# Patient Record
Sex: FEMALE | Race: WHITE | Employment: OTHER | URBAN - NONMETROPOLITAN AREA
[De-identification: names, ages, dates, MRNs, and addresses within clinical notes are randomized per-mention and may not be internally consistent; named-entity substitution may affect disease eponyms.]

---

## 2019-05-29 ENCOUNTER — OFFICE VISIT (OUTPATIENT)
Dept: SURGERY | Age: 49
End: 2019-05-29
Payer: MEDICAID

## 2019-05-29 VITALS
HEIGHT: 62 IN | WEIGHT: 131 LBS | TEMPERATURE: 98.6 F | BODY MASS INDEX: 24.11 KG/M2 | SYSTOLIC BLOOD PRESSURE: 100 MMHG | DIASTOLIC BLOOD PRESSURE: 68 MMHG | OXYGEN SATURATION: 98 % | HEART RATE: 113 BPM

## 2019-05-29 DIAGNOSIS — R10.84 GENERALIZED ABDOMINAL PAIN: Primary | ICD-10-CM

## 2019-05-29 DIAGNOSIS — R14.0 BLOATING: ICD-10-CM

## 2019-05-29 PROCEDURE — G8427 DOCREV CUR MEDS BY ELIG CLIN: HCPCS | Performed by: PHYSICIAN ASSISTANT

## 2019-05-29 PROCEDURE — 99203 OFFICE O/P NEW LOW 30 MIN: CPT | Performed by: PHYSICIAN ASSISTANT

## 2019-05-29 PROCEDURE — 1036F TOBACCO NON-USER: CPT | Performed by: PHYSICIAN ASSISTANT

## 2019-05-29 PROCEDURE — G8420 CALC BMI NORM PARAMETERS: HCPCS | Performed by: PHYSICIAN ASSISTANT

## 2019-05-29 RX ORDER — MELOXICAM 15 MG/1
15 TABLET ORAL DAILY
COMMUNITY

## 2019-05-29 RX ORDER — CYANOCOBALAMIN 1000 UG/ML
INJECTION INTRAMUSCULAR; SUBCUTANEOUS
Refills: 5 | COMMUNITY
Start: 2019-05-13

## 2019-05-29 RX ORDER — LEVOTHYROXINE SODIUM 0.07 MG/1
75 TABLET ORAL DAILY
COMMUNITY

## 2019-05-31 ENCOUNTER — ANCILLARY ORDERS (OUTPATIENT)
Dept: SURGERY | Age: 49
End: 2019-05-31

## 2019-05-31 ENCOUNTER — HOSPITAL ENCOUNTER (OUTPATIENT)
Dept: CT IMAGING | Age: 49
Discharge: HOME | End: 2019-05-31
Payer: MEDICAID

## 2019-05-31 DIAGNOSIS — K81.9 ACALCULOUS CHOLECYSTITIS: Primary | ICD-10-CM

## 2019-05-31 DIAGNOSIS — R10.84 GENERALIZED ABDOMINAL PAIN: ICD-10-CM

## 2019-05-31 DIAGNOSIS — R14.0 BLOATING: ICD-10-CM

## 2019-05-31 PROCEDURE — 6360000004 HC RX CONTRAST MEDICATION: Performed by: PHYSICIAN ASSISTANT

## 2019-05-31 PROCEDURE — 74177 CT ABD & PELVIS W/CONTRAST: CPT

## 2019-05-31 RX ORDER — HYOSCYAMINE SULFATE 0.125 MG
TABLET,DISINTEGRATING ORAL
Qty: 30 TABLET | Refills: 1 | Status: SHIPPED | OUTPATIENT
Start: 2019-05-31 | End: 2019-07-01 | Stop reason: ALTCHOICE

## 2019-05-31 RX ADMIN — IOPAMIDOL 90 ML: 755 INJECTION, SOLUTION INTRAVENOUS at 09:54

## 2019-05-31 NOTE — PROGRESS NOTES
HISTORY OF PRESENT ILLNESS:  Torsten Sanchez comes today to discuss possible gallbladder surgery. She is sent to us with primary care physician for possible gallbladder disease. At the time being seen, her radiology and laboratory data was not available. The office did fax it to us and patient was noted to have a mildly elevated white count of 13,000. She did have an ultrasound of the abdomen which showed some gallbladder sludge. Incidentally she was noted to have bilateral ovarian cysts. The patient's main complaint of postprandial abdominal pain which is been midepigastric. She is also complaining of some severe bloating. Her bloating has been if she has eaten or not. She does have some history of constipation. Current Outpatient Medications   Medication Sig Dispense Refill    ALPRAZolam (XANAX PO) Take by mouth      meloxicam (MOBIC) 15 MG tablet Take 15 mg by mouth daily      buPROPion HCl (WELLBUTRIN PO) Take by mouth      levothyroxine (SYNTHROID) 75 MCG tablet Take 75 mcg by mouth Daily      vitamin D (CHOLECALCIFEROL) 1000 UNIT TABS tablet Take 1,000 Units by mouth      hyoscyamine (NULEV) 125 MCG TBDP dispersible tablet One to two po qac/prn 30 tablet 1    cyanocobalamin 1000 MCG/ML injection   5     No current facility-administered medications for this visit. Allergies: Cefdinir; Cefzil [cefprozil]; Flexeril [cyclobenzaprine];  Skelaxin [metaxalone]; and Zithromax [azithromycin]  Past Medical History:   Diagnosis Date    Anxiety     Bursitis     Heart abnormality     patient states she has a hole in her heart, told this 20 years ago    Hypothyroidism      Past Surgical History:   Procedure Laterality Date     SECTION      MOUTH SURGERY      wisdom teeth    OTHER SURGICAL HISTORY      rectal repair after vaginal birth, 80 sutures after giving birth   [facility] TUBAL LIGATION       Family History   Problem Relation Age of Onset    Cancer Mother         ovarian, lung    Cancer Father lung cancer     Social History     Tobacco Use    Smoking status: Former Smoker    Smokeless tobacco: Never Used    Tobacco comment: Quit 8 years ago   Substance Use Topics    Alcohol use: Yes     Comment: occas           Review of Systems   14 point review of systems reviewed and positive for the above. I did discuss with her her previous cardiac issue. She states that she had what sounds like an echocardiogram done while living in [place] over 15 years ago. She denies any present chest pain. She denies any current cardiac issues. The rest of her review of systems were reviewed and are positive and outlined as above and per registration instruction sheet. Physical Exam  Blood pressure 100/68, pulse 113, temperature 98.6 °F (37 °C), temperature source Temporal, height 5' 2\" (1.575 m), weight 131 lb (59.4 kg), SpO2 98 %. GENERAL:  Reveals a 51 y.o. female that  appears to be in no acute distress. HEENT:  Head is normocephalic and atraumatic. NECK:  Neck is supple without masses    CHEST:  Patient has normal respiratory effort. Chest is clear bilaterally with good thoracic expansion. HEART:  Heart demonstrated a regular rhythm and rate with no cardiac murmurs, gallops or rubs noted to auscultation. ABDOMEN:  The abdomen is soft with midepigastric tenderness. She does have some distention. EXTREMITIES:  Extremities demonstrated no cyanosis or pitting edema bilaterally. PSYCHIATRIC:  Patient is oriented to time, place and person. The patient's mood and affect are normal.      IMPRESSION:  Abdominal plank pain and bloating  Possible acalculous cholecystitis    PLAN:   I believe her symptoms warrant a CT scan for further evaluation. We will plan to get the CT scan done ASA and have her come back to the office to discuss.

## 2019-05-31 NOTE — PROGRESS NOTES
Patient came by the office after a CT scan was performed. I discussed her CT scan with the radiologist which showed some significant amount of stool in the colon and no acute anomalies. I discussed the risks benefits and alternatives of laparoscopic cholecystectomy with her and her . She is aware that it may not alleviate all of her symptoms. After full discussion of the surgery, she wished to proceed. At this time, we have elected not to get a HIDA scan as her gallbladder ultrasound did show some sludge and her pain seems to be primarily postprandial.    This has been electronically signed by Jose Arias.  Mary Gold PA-C.

## 2019-06-10 ENCOUNTER — ANESTHESIA EVENT (OUTPATIENT)
Dept: OPERATING ROOM | Age: 49
End: 2019-06-10

## 2019-06-10 RX ORDER — POLYETHYLENE GLYCOL 3350 17 G/17G
17 POWDER, FOR SOLUTION ORAL DAILY PRN
COMMUNITY

## 2019-06-14 ENCOUNTER — PREP FOR PROCEDURE (OUTPATIENT)
Dept: SURGERY | Age: 49
End: 2019-06-14

## 2019-06-14 ENCOUNTER — ANESTHESIA (OUTPATIENT)
Dept: OPERATING ROOM | Age: 49
End: 2019-06-14

## 2019-06-14 ENCOUNTER — HOSPITAL ENCOUNTER (OUTPATIENT)
Age: 49
Setting detail: SPECIMEN
Discharge: HOME | End: 2019-06-14
Payer: MEDICAID

## 2019-06-14 ENCOUNTER — HOSPITAL ENCOUNTER (OUTPATIENT)
Age: 49
Setting detail: OUTPATIENT SURGERY
Discharge: HOME | End: 2019-06-14
Attending: SURGERY | Admitting: SURGERY
Payer: MEDICAID

## 2019-06-14 VITALS
HEIGHT: 62 IN | TEMPERATURE: 98.5 F | OXYGEN SATURATION: 98 % | DIASTOLIC BLOOD PRESSURE: 67 MMHG | HEART RATE: 75 BPM | SYSTOLIC BLOOD PRESSURE: 106 MMHG | WEIGHT: 131 LBS | BODY MASS INDEX: 24.11 KG/M2 | RESPIRATION RATE: 20 BRPM

## 2019-06-14 VITALS
SYSTOLIC BLOOD PRESSURE: 136 MMHG | RESPIRATION RATE: 8 BRPM | OXYGEN SATURATION: 99 % | DIASTOLIC BLOOD PRESSURE: 82 MMHG

## 2019-06-14 DIAGNOSIS — Z90.49 S/P LAPAROSCOPIC CHOLECYSTECTOMY: ICD-10-CM

## 2019-06-14 DIAGNOSIS — G89.18 POST-OP PAIN: Primary | ICD-10-CM

## 2019-06-14 PROCEDURE — 47562 LAPAROSCOPIC CHOLECYSTECTOMY: CPT | Performed by: PHYSICIAN ASSISTANT

## 2019-06-14 PROCEDURE — G8907 PT DOC NO EVENTS ON DISCHARG: HCPCS | Performed by: NURSE PRACTITIONER

## 2019-06-14 PROCEDURE — 47563 LAPARO CHOLECYSTECTOMY/GRAPH: CPT

## 2019-06-14 PROCEDURE — G8916 PT W IV AB GIVEN ON TIME: HCPCS | Performed by: NURSE PRACTITIONER

## 2019-06-14 PROCEDURE — 47563 LAPARO CHOLECYSTECTOMY/GRAPH: CPT | Performed by: SURGERY

## 2019-06-14 PROCEDURE — 88304 TISSUE EXAM BY PATHOLOGIST: CPT

## 2019-06-14 RX ORDER — MORPHINE SULFATE 10 MG/ML
4 INJECTION, SOLUTION INTRAMUSCULAR; INTRAVENOUS EVERY 5 MIN PRN
Status: DISCONTINUED | OUTPATIENT
Start: 2019-06-14 | End: 2019-06-14 | Stop reason: HOSPADM

## 2019-06-14 RX ORDER — PROPOFOL 10 MG/ML
INJECTION, EMULSION INTRAVENOUS PRN
Status: DISCONTINUED | OUTPATIENT
Start: 2019-06-14 | End: 2019-06-14 | Stop reason: SDUPTHER

## 2019-06-14 RX ORDER — LABETALOL HYDROCHLORIDE 5 MG/ML
INJECTION, SOLUTION INTRAVENOUS PRN
Status: DISCONTINUED | OUTPATIENT
Start: 2019-06-14 | End: 2019-06-14 | Stop reason: SDUPTHER

## 2019-06-14 RX ORDER — DIPHENHYDRAMINE HYDROCHLORIDE 50 MG/ML
12.5 INJECTION INTRAMUSCULAR; INTRAVENOUS
Status: DISCONTINUED | OUTPATIENT
Start: 2019-06-14 | End: 2019-06-14 | Stop reason: HOSPADM

## 2019-06-14 RX ORDER — ONDANSETRON 2 MG/ML
4 INJECTION INTRAMUSCULAR; INTRAVENOUS EVERY 6 HOURS PRN
Status: DISCONTINUED | OUTPATIENT
Start: 2019-06-14 | End: 2019-06-14 | Stop reason: HOSPADM

## 2019-06-14 RX ORDER — SODIUM CHLORIDE 0.9 % (FLUSH) 0.9 %
10 SYRINGE (ML) INJECTION EVERY 12 HOURS SCHEDULED
Status: DISCONTINUED | OUTPATIENT
Start: 2019-06-14 | End: 2019-06-14 | Stop reason: HOSPADM

## 2019-06-14 RX ORDER — MORPHINE SULFATE 10 MG/ML
4 INJECTION, SOLUTION INTRAMUSCULAR; INTRAVENOUS
Status: DISCONTINUED | OUTPATIENT
Start: 2019-06-14 | End: 2019-06-14 | Stop reason: HOSPADM

## 2019-06-14 RX ORDER — PROMETHAZINE HYDROCHLORIDE 25 MG/ML
6.25 INJECTION, SOLUTION INTRAMUSCULAR; INTRAVENOUS
Status: DISCONTINUED | OUTPATIENT
Start: 2019-06-14 | End: 2019-06-14 | Stop reason: HOSPADM

## 2019-06-14 RX ORDER — SODIUM CHLORIDE 0.9 % (FLUSH) 0.9 %
10 SYRINGE (ML) INJECTION PRN
Status: DISCONTINUED | OUTPATIENT
Start: 2019-06-14 | End: 2019-06-14 | Stop reason: HOSPADM

## 2019-06-14 RX ORDER — HYDROCODONE BITARTRATE AND ACETAMINOPHEN 5; 325 MG/1; MG/1
2 TABLET ORAL EVERY 6 HOURS PRN
Qty: 30 TABLET | Refills: 0 | Status: SHIPPED | OUTPATIENT
Start: 2019-06-14 | End: 2019-06-19

## 2019-06-14 RX ORDER — HYDRALAZINE HYDROCHLORIDE 20 MG/ML
5 INJECTION INTRAMUSCULAR; INTRAVENOUS EVERY 10 MIN PRN
Status: DISCONTINUED | OUTPATIENT
Start: 2019-06-14 | End: 2019-06-14 | Stop reason: HOSPADM

## 2019-06-14 RX ORDER — DEXAMETHASONE SODIUM PHOSPHATE 10 MG/ML
INJECTION INTRAMUSCULAR; INTRAVENOUS PRN
Status: DISCONTINUED | OUTPATIENT
Start: 2019-06-14 | End: 2019-06-14 | Stop reason: SDUPTHER

## 2019-06-14 RX ORDER — HYDROCODONE BITARTRATE AND ACETAMINOPHEN 5; 325 MG/1; MG/1
2 TABLET ORAL EVERY 4 HOURS PRN
Status: DISCONTINUED | OUTPATIENT
Start: 2019-06-14 | End: 2019-06-14 | Stop reason: HOSPADM

## 2019-06-14 RX ORDER — METOCLOPRAMIDE HYDROCHLORIDE 5 MG/ML
10 INJECTION INTRAMUSCULAR; INTRAVENOUS
Status: DISCONTINUED | OUTPATIENT
Start: 2019-06-14 | End: 2019-06-14 | Stop reason: HOSPADM

## 2019-06-14 RX ORDER — ENALAPRILAT 2.5 MG/2ML
1.25 INJECTION INTRAVENOUS
Status: DISCONTINUED | OUTPATIENT
Start: 2019-06-14 | End: 2019-06-14 | Stop reason: HOSPADM

## 2019-06-14 RX ORDER — HYDROMORPHONE HCL 110MG/55ML
0.5 PATIENT CONTROLLED ANALGESIA SYRINGE INTRAVENOUS EVERY 5 MIN PRN
Status: DISCONTINUED | OUTPATIENT
Start: 2019-06-14 | End: 2019-06-14 | Stop reason: HOSPADM

## 2019-06-14 RX ORDER — HYDROMORPHONE HCL 110MG/55ML
0.25 PATIENT CONTROLLED ANALGESIA SYRINGE INTRAVENOUS EVERY 5 MIN PRN
Status: DISCONTINUED | OUTPATIENT
Start: 2019-06-14 | End: 2019-06-14 | Stop reason: HOSPADM

## 2019-06-14 RX ORDER — ROCURONIUM BROMIDE 10 MG/ML
INJECTION, SOLUTION INTRAVENOUS PRN
Status: DISCONTINUED | OUTPATIENT
Start: 2019-06-14 | End: 2019-06-14 | Stop reason: SDUPTHER

## 2019-06-14 RX ORDER — SODIUM CHLORIDE, SODIUM LACTATE, POTASSIUM CHLORIDE, CALCIUM CHLORIDE 600; 310; 30; 20 MG/100ML; MG/100ML; MG/100ML; MG/100ML
INJECTION, SOLUTION INTRAVENOUS CONTINUOUS
Status: DISCONTINUED | OUTPATIENT
Start: 2019-06-14 | End: 2019-06-14 | Stop reason: HOSPADM

## 2019-06-14 RX ORDER — FENTANYL CITRATE 50 UG/ML
INJECTION, SOLUTION INTRAMUSCULAR; INTRAVENOUS PRN
Status: DISCONTINUED | OUTPATIENT
Start: 2019-06-14 | End: 2019-06-14 | Stop reason: SDUPTHER

## 2019-06-14 RX ORDER — MORPHINE SULFATE 10 MG/ML
2 INJECTION, SOLUTION INTRAMUSCULAR; INTRAVENOUS EVERY 5 MIN PRN
Status: DISCONTINUED | OUTPATIENT
Start: 2019-06-14 | End: 2019-06-14 | Stop reason: HOSPADM

## 2019-06-14 RX ORDER — LABETALOL HYDROCHLORIDE 5 MG/ML
5 INJECTION, SOLUTION INTRAVENOUS EVERY 10 MIN PRN
Status: DISCONTINUED | OUTPATIENT
Start: 2019-06-14 | End: 2019-06-14 | Stop reason: HOSPADM

## 2019-06-14 RX ORDER — HYDROCODONE BITARTRATE AND ACETAMINOPHEN 5; 325 MG/1; MG/1
1 TABLET ORAL EVERY 4 HOURS PRN
Status: DISCONTINUED | OUTPATIENT
Start: 2019-06-14 | End: 2019-06-14 | Stop reason: HOSPADM

## 2019-06-14 RX ORDER — BUPIVACAINE HYDROCHLORIDE AND EPINEPHRINE 2.5; 5 MG/ML; UG/ML
INJECTION, SOLUTION INFILTRATION; PERINEURAL PRN
Status: DISCONTINUED | OUTPATIENT
Start: 2019-06-14 | End: 2019-06-14 | Stop reason: ALTCHOICE

## 2019-06-14 RX ORDER — LIDOCAINE HYDROCHLORIDE 10 MG/ML
INJECTION, SOLUTION INFILTRATION; PERINEURAL PRN
Status: DISCONTINUED | OUTPATIENT
Start: 2019-06-14 | End: 2019-06-14 | Stop reason: SDUPTHER

## 2019-06-14 RX ORDER — KETOROLAC TROMETHAMINE 30 MG/ML
INJECTION, SOLUTION INTRAMUSCULAR; INTRAVENOUS PRN
Status: DISCONTINUED | OUTPATIENT
Start: 2019-06-14 | End: 2019-06-14 | Stop reason: SDUPTHER

## 2019-06-14 RX ORDER — MEPERIDINE HYDROCHLORIDE 25 MG/ML
12.5 INJECTION INTRAMUSCULAR; INTRAVENOUS; SUBCUTANEOUS EVERY 5 MIN PRN
Status: DISCONTINUED | OUTPATIENT
Start: 2019-06-14 | End: 2019-06-14 | Stop reason: HOSPADM

## 2019-06-14 RX ORDER — MORPHINE SULFATE 10 MG/ML
2 INJECTION, SOLUTION INTRAMUSCULAR; INTRAVENOUS
Status: DISCONTINUED | OUTPATIENT
Start: 2019-06-14 | End: 2019-06-14 | Stop reason: HOSPADM

## 2019-06-14 RX ORDER — ONDANSETRON 2 MG/ML
INJECTION INTRAMUSCULAR; INTRAVENOUS PRN
Status: DISCONTINUED | OUTPATIENT
Start: 2019-06-14 | End: 2019-06-14 | Stop reason: SDUPTHER

## 2019-06-14 RX ADMIN — ONDANSETRON 4 MG: 2 INJECTION INTRAMUSCULAR; INTRAVENOUS at 09:34

## 2019-06-14 RX ADMIN — PROPOFOL 150 MG: 10 INJECTION, EMULSION INTRAVENOUS at 09:26

## 2019-06-14 RX ADMIN — Medication 0.5 MG: at 11:07

## 2019-06-14 RX ADMIN — KETOROLAC TROMETHAMINE 30 MG: 30 INJECTION, SOLUTION INTRAMUSCULAR; INTRAVENOUS at 09:34

## 2019-06-14 RX ADMIN — FENTANYL CITRATE 50 MCG: 50 INJECTION, SOLUTION INTRAMUSCULAR; INTRAVENOUS at 10:28

## 2019-06-14 RX ADMIN — FENTANYL CITRATE 100 MCG: 50 INJECTION, SOLUTION INTRAMUSCULAR; INTRAVENOUS at 09:59

## 2019-06-14 RX ADMIN — SODIUM CHLORIDE, SODIUM LACTATE, POTASSIUM CHLORIDE, CALCIUM CHLORIDE: 600; 310; 30; 20 INJECTION, SOLUTION INTRAVENOUS at 08:37

## 2019-06-14 RX ADMIN — ROCURONIUM BROMIDE 40 MG: 10 INJECTION, SOLUTION INTRAVENOUS at 09:26

## 2019-06-14 RX ADMIN — LIDOCAINE HYDROCHLORIDE 50 MG: 10 INJECTION, SOLUTION INFILTRATION; PERINEURAL at 09:26

## 2019-06-14 RX ADMIN — FENTANYL CITRATE 100 MCG: 50 INJECTION, SOLUTION INTRAMUSCULAR; INTRAVENOUS at 09:26

## 2019-06-14 RX ADMIN — SODIUM CHLORIDE, SODIUM LACTATE, POTASSIUM CHLORIDE, CALCIUM CHLORIDE: 600; 310; 30; 20 INJECTION, SOLUTION INTRAVENOUS at 09:21

## 2019-06-14 RX ADMIN — SODIUM CHLORIDE, SODIUM LACTATE, POTASSIUM CHLORIDE, CALCIUM CHLORIDE: 600; 310; 30; 20 INJECTION, SOLUTION INTRAVENOUS at 10:01

## 2019-06-14 RX ADMIN — Medication 0.25 MG: at 10:50

## 2019-06-14 RX ADMIN — LABETALOL HYDROCHLORIDE 5 MG: 5 INJECTION, SOLUTION INTRAVENOUS at 10:39

## 2019-06-14 RX ADMIN — DEXAMETHASONE SODIUM PHOSPHATE 10 MG: 10 INJECTION INTRAMUSCULAR; INTRAVENOUS at 09:34

## 2019-06-14 RX ADMIN — Medication 0.25 MG: at 10:37

## 2019-06-14 ASSESSMENT — LIFESTYLE VARIABLES: SMOKING_STATUS: 1

## 2019-06-14 NOTE — H&P
HISTORY OF PRESENT ILLNESS:  Roberto Sorenson comes today to discuss possible gallbladder surgery. She is sent to us with primary care physician for possible gallbladder disease. At the time being seen, her radiology and laboratory data was not available. The office did fax it to us and patient was noted to have a mildly elevated white count of 13,000. She did have an ultrasound of the abdomen which showed some gallbladder sludge. Incidentally she was noted to have bilateral ovarian cysts. The patient's main complaint of postprandial abdominal pain which is been midepigastric. She is also complaining of some severe bloating. Her bloating has been if she has eaten or not. She does have some history of constipation.            Current Outpatient Medications   Medication Sig Dispense Refill    ALPRAZolam (XANAX PO) Take by mouth        meloxicam (MOBIC) 15 MG tablet Take 15 mg by mouth daily        buPROPion HCl (WELLBUTRIN PO) Take by mouth        levothyroxine (SYNTHROID) 75 MCG tablet Take 75 mcg by mouth Daily        vitamin D (CHOLECALCIFEROL) 1000 UNIT TABS tablet Take 1,000 Units by mouth        hyoscyamine (NULEV) 125 MCG TBDP dispersible tablet One to two po qac/prn 30 tablet 1    cyanocobalamin 1000 MCG/ML injection     5      No current facility-administered medications for this visit.       Allergies: Cefdinir; Cefzil [cefprozil]; Flexeril [cyclobenzaprine];  Skelaxin [metaxalone]; and Zithromax [azithromycin]       Past Medical History:   Diagnosis Date    Anxiety      Bursitis      Heart abnormality       patient states she has a hole in her heart, told this 20 years ago    Hypothyroidism              Past Surgical History:   Procedure Laterality Date     SECTION        MOUTH SURGERY         wisdom teeth    OTHER SURGICAL HISTORY         rectal repair after vaginal birth, 144 sutures after giving birth   [facility] TUBAL LIGATION                Family History   Problem Relation Age of Onset    Cancer Mother           ovarian, lung    Cancer Father           lung cancer      Social History      Tobacco Use    Smoking status: Former Smoker    Smokeless tobacco: Never Used    Tobacco comment: Quit 8 years ago   Substance Use Topics    Alcohol use: Yes       Comment: occas               Review of Systems   14 point review of systems reviewed and positive for the above. I did discuss with her her previous cardiac issue. She states that she had what sounds like an echocardiogram done while living in [facility] over 15 years ago. She denies any present chest pain. She denies any current cardiac issues. The rest of her review of systems were reviewed and are positive and outlined as above and per registration instruction sheet.     Physical Exam  Blood pressure 100/68, pulse 113, temperature 98.6 °F (37 °C), temperature source Temporal, height 5' 2\" (1.575 m), weight 131 lb (59.4 kg), SpO2 98 %.     GENERAL:  Reveals a 51 y.o. female that  appears to be in no acute distress.     HEENT:  Head is normocephalic and atraumatic.     NECK:  Neck is supple without masses     CHEST:  Patient has normal respiratory effort. Chest is clear bilaterally with good thoracic expansion.       HEART:  Heart demonstrated a regular rhythm and rate with no cardiac murmurs, gallops or rubs noted to auscultation.     ABDOMEN:  The abdomen is soft with midepigastric tenderness. She does have some distention.     EXTREMITIES:  Extremities demonstrated no cyanosis or pitting edema bilaterally.     PSYCHIATRIC:  Patient is oriented to time, place and person. The patient's mood and affect are normal.        IMPRESSION:  Abdominal plank pain and bloating  Possible acalculous cholecystitis     PLAN:   I believe her symptoms warrant a CT scan for further evaluation. We will plan to get the CT scan done ASA and have her come back to the office to discuss.

## 2019-06-14 NOTE — BRIEF OP NOTE
Brief Postoperative Note  ______________________________________________________________    Patient: Emma Hiadlgo  YOB: 1970  MRN: [id number]  Date of Procedure: 6/14/2019    Pre-Op Diagnosis: ACALCULOUS CHOLECYSTITIS    Post-Op Diagnosis: Same       Procedure(s):  CHOLECYSTECTOMY LAPAROSCOPIC WITH CHOLANGIOGRAM    Anesthesia: General    Surgeon(s):  Bryanna Zepeda MD    Assistant: Enzo Murphy    Estimated Blood Loss (mL): minimal    Complications: None    Specimens:   ID Type Source Tests Collected by Time Destination   A : GALLBLADDER Tissue Gallbladder SURGICAL PATHOLOGY Bryanna Zepeda MD 6/14/2019 1003        Implants:  * No implants in log *      Drains: * No LDAs found *    Findings: the patient appeared to have an accessory duct right at the neck of the gallbladder. A cholangiogram was done to make sure this was not the common duct. The gallbladder was clipped on the neck to exclude the cystic duct and this accessory duct. Unfortunately, unable to print or send cholangiogram to radiology due to equipment at outpatient surgery center.     Bryanna Zepeda MD  Date: 6/14/2019  Time: 10:58 AM

## 2019-06-14 NOTE — ANESTHESIA POSTPROCEDURE EVALUATION
Department of Anesthesiology  Postprocedure Note    Patient: Wendi Manuel  MRN: [id number]  YOB: 1970  Date of evaluation: 6/14/2019  Time:  11:09 AM     Procedure Summary     Date:  06/14/19 Room / Location:  [facility] OR  / [facility] OR    Anesthesia Start:  0921 Anesthesia Stop:  1109    Procedure:  CHOLECYSTECTOMY LAPAROSCOPIC WITH CHOLANGIOGRAM (N/A Abdomen) Diagnosis:  (ACALCULOUS CHOLECYSTITIS)    Surgeon:  Kwabena Vargas MD Responsible Provider:  ROSA Thurston CRNA    Anesthesia Type:  general ASA Status:  2          Anesthesia Type: general    Jaquelin Phase I:      Jaquelin Phase II:      Last vitals: Reviewed and per EMR flowsheets. Anesthesia Post Evaluation    Patient location during evaluation: PACU  Patient participation: complete - patient participated  Level of consciousness: awake and alert  Pain score: 0  Airway patency: patent  Nausea & Vomiting: no nausea and no vomiting  Complications: no  Cardiovascular status: hemodynamically stable and blood pressure returned to baseline  Respiratory status: acceptable and nasal cannula  Hydration status: stable  Comments: Vital Signs Stable. Exchanging well. PACU RN received care.

## 2019-06-14 NOTE — DISCHARGE INSTR - DIET

## 2019-06-14 NOTE — H&P (VIEW-ONLY)
Cancer Mother           ovarian, lung    Cancer Father           lung cancer      Social History      Tobacco Use    Smoking status: Former Smoker    Smokeless tobacco: Never Used    Tobacco comment: Quit 8 years ago   Substance Use Topics    Alcohol use: Yes       Comment: occas               Review of Systems   14 point review of systems reviewed and positive for the above. I did discuss with her her previous cardiac issue. She states that she had what sounds like an echocardiogram done while living in [place] over 15 years ago. She denies any present chest pain. She denies any current cardiac issues. The rest of her review of systems were reviewed and are positive and outlined as above and per registration instruction sheet.     Physical Exam  Blood pressure 100/68, pulse 113, temperature 98.6 °F (37 °C), temperature source Temporal, height 5' 2\" (1.575 m), weight 131 lb (59.4 kg), SpO2 98 %.     GENERAL:  Reveals a 51 y.o. female that  appears to be in no acute distress.     HEENT:  Head is normocephalic and atraumatic.     NECK:  Neck is supple without masses     CHEST:  Patient has normal respiratory effort. Chest is clear bilaterally with good thoracic expansion.       HEART:  Heart demonstrated a regular rhythm and rate with no cardiac murmurs, gallops or rubs noted to auscultation.     ABDOMEN:  The abdomen is soft with midepigastric tenderness. She does have some distention.     EXTREMITIES:  Extremities demonstrated no cyanosis or pitting edema bilaterally.     PSYCHIATRIC:  Patient is oriented to time, place and person. The patient's mood and affect are normal.        IMPRESSION:  Abdominal plank pain and bloating  Possible acalculous cholecystitis     PLAN:   I believe her symptoms warrant a CT scan for further evaluation. We will plan to get the CT scan done ASA and have her come back to the office to discuss.

## 2019-06-14 NOTE — ANESTHESIA PRE PROCEDURE
Department of Anesthesiology  Preprocedure Note       Name:  Kaylin Emmanuel   Age:  51 y.o.  :  1970                                          MRN:  [id number]         Date:  2019      Surgeon: Nico Mcgrath):  Lo Cain MD    Procedure: CHOLECYSTECTOMY LAPAROSCOPIC (N/A Abdomen)    Medications prior to admission:   Prior to Admission medications    Medication Sig Start Date End Date Taking?  Authorizing Provider   polyethylene glycol (GLYCOLAX) packet Take 17 g by mouth daily as needed for Constipation   Yes Historical Provider, MD   ALPRAZolam (XANAX PO) Take by mouth   Yes Historical Provider, MD   levothyroxine (SYNTHROID) 75 MCG tablet Take 75 mcg by mouth Daily   Yes Historical Provider, MD   hyoscyamine (NULEV) 125 MCG TBDP dispersible tablet One to two po qac/prn 19   Ilsa Lira PA-C   cyanocobalamin 1000 MCG/ML injection  19   Historical Provider, MD   meloxicam (MOBIC) 15 MG tablet Take 15 mg by mouth daily    Historical Provider, MD   buPROPion HCl (WELLBUTRIN PO) Take by mouth    Historical Provider, MD   vitamin D (CHOLECALCIFEROL) 1000 UNIT TABS tablet Take 1,000 Units by mouth    Historical Provider, MD       Current medications:    Current Facility-Administered Medications   Medication Dose Route Frequency Provider Last Rate Last Dose    lactated ringers infusion   Intravenous Continuous ROSA De Jesus CRNA 125 mL/hr at 19 0837      clindamycin (CLEOCIN) 900 mg in dextrose 5 % 50 mL IVPB  900 mg Intravenous 30 Min Pre-Op Lo Cain MD        HYDROmorphone (DILAUDID) injection 0.25 mg  0.25 mg Intravenous Q5 Min PRN ROSA Bhat - ROSALBA        HYDROmorphone (DILAUDID) injection 0.5 mg  0.5 mg Intravenous Q5 Min PRN ROSA Bhat - CRNA        morphine injection 2 mg  2 mg Intravenous Q5 Min PRN ROSA Bhat - CRNA        morphine injection 4 mg  4 mg Intravenous Q5 Min PRN ROSA Bhat - CRNA        diphenhydrAMINE (BENADRYL) injection 12.5 mg  12.5 mg Intravenous Once PRN [place] Right, APRN - CRNA        promethazine (PHENERGAN) injection 6.25 mg  6.25 mg Intravenous Once PRN [place] Right, APRN - CRNA        metoclopramide (REGLAN) injection 10 mg  10 mg Intravenous Once PRN [place] Right, APRN - CRNA        labetalol (NORMODYNE;TRANDATE) injection 5 mg  5 mg Intravenous Q10 Min PRN Dion Right, APRN - CRNA        hydrALAZINE (APRESOLINE) injection 5 mg  5 mg Intravenous Q10 Min PRN [place] Right, APRN - CRNA        enalaprilat (VASOTEC) injection 1.25 mg  1.25 mg Intravenous Once PRN [place] Right, APRN - CRNA        meperidine (DEMEROL) injection 12.5 mg  12.5 mg Intravenous Q5 Min PRN Dion Right, APRN - CRNA           Allergies: Allergies   Allergen Reactions    Cefdinir Hives    Cefzil [Cefprozil] Hives    Flexeril [Cyclobenzaprine]      Restless leg    Skelaxin [Metaxalone]      Can't wakeup on this medication    Zithromax [Azithromycin] Hives       Problem List:  There is no problem list on file for this patient.       Past Medical History:        Diagnosis Date    Anxiety     Arthritis     Bursitis     Heart abnormality     patient states she has a hole in her heart, told this 20 years ago    Hypothyroidism        Past Surgical History:        Procedure Laterality Date     SECTION      MOUTH SURGERY      wisdom teeth    OTHER SURGICAL HISTORY      rectal repair after vaginal birth, 80 sutures after giving birth   [facility] TUBAL LIGATION         Social History:    Social History     Tobacco Use    Smoking status: Former Smoker    Smokeless tobacco: Never Used    Tobacco comment: Quit 8 years ago   Substance Use Topics    Alcohol use: Yes     Comment: occas                                Counseling given: Not Answered  Comment: Quit 8 years ago      Vital Signs (Current):   Vitals:    06/10/19 1343 19 0814   BP:  107/65   Pulse:  87   Resp:  20   SpO2:  96%   Weight: 131 lb (59.4 kg) 131 lb (59.4 kg)   Height: 5' 2\" (1.575 m) 5' 2\" (1.575 m)                                              BP Readings from Last 3 Encounters:   06/14/19 107/65   05/29/19 100/68       NPO Status: Time of last liquid consumption: 0700(sip water with meds)                        Time of last solid consumption: 2000                        Date of last liquid consumption: 06/14/19                        Date of last solid food consumption: 06/13/19    BMI:   Wt Readings from Last 3 Encounters:   06/14/19 131 lb (59.4 kg)   05/29/19 131 lb (59.4 kg)     Body mass index is 23.96 kg/m². CBC: No results found for: WBC, RBC, HGB, HCT, MCV, RDW, PLT    CMP: No results found for: NA, K, CL, CO2, BUN, CREATININE, GFRAA, AGRATIO, LABGLOM, GLUCOSE, PROT, CALCIUM, BILITOT, ALKPHOS, AST, ALT    POC Tests: No results for input(s): POCGLU, POCNA, POCK, POCCL, POCBUN, POCHEMO, POCHCT in the last 72 hours. Coags: No results found for: PROTIME, INR, APTT    HCG (If Applicable): No results found for: PREGTESTUR, PREGSERUM, HCG, HCGQUANT     ABGs: No results found for: PHART, PO2ART, VGL6SQN, DUK0KWT, BEART, S5AFPNRU     Type & Screen (If Applicable):  No results found for: LABABO, [address]    Anesthesia Evaluation  Patient summary reviewed no history of anesthetic complications:   Airway: Mallampati: I  TM distance: >3 FB   Neck ROM: full  Mouth opening: > = 3 FB Dental: normal exam         Pulmonary:Negative Pulmonary ROS breath sounds clear to auscultation  (+) current smoker (2 cigs a day)                           Cardiovascular:  Exercise tolerance: good (>4 METS),                     Neuro/Psych:   Negative Neuro/Psych ROS              GI/Hepatic/Renal:   (+) GERD: well controlled,      (-) hepatitis and no renal disease       Endo/Other:    (+) hypothyroidism: arthritis:., .    (-) diabetes mellitus               Abdominal:           Vascular: negative vascular ROS.                                        Anesthesia Plan      general     ASA 2 Induction: intravenous. MIPS: Postoperative opioids intended and Prophylactic antiemetics administered. Anesthetic plan and risks discussed with patient.                       ROSA Craft - ROSALBA   6/14/2019

## 2019-06-14 NOTE — DISCHARGE INSTR - ACTIVITY
Activity as tolerated except no lifting more than 10-15 pounds for the first week and no driving while taking pain medication. Okay to shower over incisions but do not submerge under water like a pool or tub for one week. Watch for redness or drainage from the incisions, and call for any questions or concerns.

## 2019-06-15 NOTE — INTERVAL H&P NOTE
H&P Update    Patient's History and Physical from May 29, 2019 was reviewed. Patient examined. There has been no change.     Thelda Bamberger

## 2019-06-21 NOTE — OP NOTE
liver. The cystic artery and cystic duct were then identified and dissected circumferentially using a combination of electrocautery and blunt dissection. As the cystic duct was dissected, it was found to curl up at the neck. There appeared to be a cystic duct going down towards the common bile duct, but also an additional cystic structure attached to the cystic duct and neck junction, which was coming from posteriorly. It was decided to do a cholangiogram to better define this anatomy. Small incision was made in the wall of the gallbladder, and the catheter introducer was placed through the 5mm port. The catheter was advanced through the small opening and clipped in place. The catheter flushed easily. The contrast was then flushed under fluoro. There were no obvious filling defects in the CBD. The neck of the gallbladder was visualized, with what appeared to be an additional duct coming in to the cystic duct from a hepatic duct, draining through the cystic duct in to the common duct. With this delineated, it was decided to divide the gallbladder right at the neck above this juncture. The neck was clipped twice proximally and once distally, and divided between this. The artery was clipped twice proximally and once distally and divided as well. Dissection continued along the posterior edge of the liver, onto the cystic plate. The gallbladder was then dissected off the liver bed using cautery. The gallbladder was then removed from the abdominal cavity using and endocatch bag. The surgical field was irrigated and suctioned. The clips were inspected and found to be in good position. There was good hemostasis and no evidence of bile leak. The camera was then inserted through one of the right upper quadrant ports and the umbilical port was inspected. There was no evidence of port placement injury. The epigastric port fascia was then closed using an 0-vicryl and a suture passer.  The right upper quadrant ports were removed under direct visualization, there was good hemostasis. The remaining laparoscopic equipment was removed, insufflation was removed, and the skin incisions were closed using 4-0 monocryl subcuticular stitches. Sterile dressings with dermabond were applied. The patient tolerated the procedure well, was extubated, and transferred to the recovery area in stable condition.                   Ta Perry MD   Electronically signed 06/21/19 4:34 PM

## 2019-07-01 ENCOUNTER — OFFICE VISIT (OUTPATIENT)
Dept: SURGERY | Age: 49
End: 2019-07-01

## 2019-07-01 VITALS
BODY MASS INDEX: 23.19 KG/M2 | SYSTOLIC BLOOD PRESSURE: 118 MMHG | DIASTOLIC BLOOD PRESSURE: 70 MMHG | WEIGHT: 126 LBS | HEIGHT: 62 IN | TEMPERATURE: 97.2 F

## 2019-07-01 DIAGNOSIS — Z90.49 S/P LAPAROSCOPIC CHOLECYSTECTOMY: Primary | ICD-10-CM

## 2019-07-01 PROCEDURE — 99024 POSTOP FOLLOW-UP VISIT: CPT | Performed by: SURGERY

## (undated) DEVICE — AIRWAY CIRCUIT: Brand: DEROYAL

## (undated) DEVICE — INTENDED FOR TISSUE SEPARATION, AND OTHER PROCEDURES THAT REQUIRE A SHARP SURGICAL BLADE TO PUNCTURE OR CUT.: Brand: BARD-PARKER ® CARBON RIB-BACK BLADES

## (undated) DEVICE — ASTOUND STANDARD SURGICAL GOWN, XL: Brand: CONVERTORS

## (undated) DEVICE — MAJOR BSIN SETUP PK

## (undated) DEVICE — LOOP LIG SUT SZ 0 L18IN ABSRB POLYDIOXANONE MFIL PDS II

## (undated) DEVICE — APPLIER CLP M/L SHFT DIA5MM 15 LIG LIGAMAX 5

## (undated) DEVICE — 9165 UNIVERSAL PATIENT PLATE: Brand: 3M™

## (undated) DEVICE — GLOVE SURG SZ 7 CRM LTX FREE POLYISOPRENE POLYMER BEAD ANTI

## (undated) DEVICE — CHLORAPREP 26ML ORANGE

## (undated) DEVICE — ADHESIVE SKIN CLSR 0.7ML TOP DERMBND ADV

## (undated) DEVICE — MASK ANES AD M SZ 5 PREM TAIL VLV INFL PRT UNSCENTED HK RNG

## (undated) DEVICE — TROCAR: Brand: KII SHIELDED BLADED ACCESS SYSTEM

## (undated) DEVICE — SUTURE VCRL SZ 3-0 L27IN ABSRB UD L26MM SH 1/2 CIR J416H

## (undated) DEVICE — TROCAR ENDOSCP BLADED 5X100 MM

## (undated) DEVICE — INSUFFLATION TUBING,LAPAROSCOPIC: Brand: DEROYAL

## (undated) DEVICE — SUTURE MCRYL SZ 4-0 L18IN ABSRB UD L19MM PS-2 3/8 CIR PRIM Y496G

## (undated) DEVICE — CONMED GOLDLINE ELECTROSURGICAL HANDPIECE, HAND CONTROLLED WITH BLADE ELECTRODE, BUTTON SWITCH, SAFETY HOLSTER AND 10 FT (3 M) CABLE: Brand: CONMED GOLDLINE

## (undated) DEVICE — SUTURE SZ 0 27IN 5/8 CIR UR-6  TAPER PT VIOLET ABSRB VICRYL J603H

## (undated) DEVICE — KIT,ANTI FOG,W/SPONGE & FLUID,SOFT PACK: Brand: MEDLINE

## (undated) DEVICE — DRAPE,LAP,CHOLE,W/TROUGHS,STERILE: Brand: MEDLINE

## (undated) DEVICE — POUCH SPEC RETRV ENDO